# Patient Record
Sex: FEMALE | Race: WHITE | NOT HISPANIC OR LATINO | Employment: OTHER | URBAN - METROPOLITAN AREA
[De-identification: names, ages, dates, MRNs, and addresses within clinical notes are randomized per-mention and may not be internally consistent; named-entity substitution may affect disease eponyms.]

---

## 2017-01-23 ENCOUNTER — OFFICE VISIT (OUTPATIENT)
Dept: CARDIOLOGY | Facility: CLINIC | Age: 82
End: 2017-01-23

## 2017-01-23 VITALS
HEIGHT: 59 IN | SYSTOLIC BLOOD PRESSURE: 155 MMHG | DIASTOLIC BLOOD PRESSURE: 76 MMHG | BODY MASS INDEX: 23.39 KG/M2 | HEART RATE: 60 BPM | WEIGHT: 116 LBS

## 2017-01-23 DIAGNOSIS — I50.33 ACUTE ON CHRONIC DIASTOLIC HEART FAILURE (HCC): ICD-10-CM

## 2017-01-23 DIAGNOSIS — E78.00 HYPERCHOLESTEROLEMIA: ICD-10-CM

## 2017-01-23 DIAGNOSIS — I10 ESSENTIAL HYPERTENSION: ICD-10-CM

## 2017-01-23 DIAGNOSIS — I48.0 PAROXYSMAL ATRIAL FIBRILLATION (HCC): Primary | Chronic | ICD-10-CM

## 2017-01-23 PROCEDURE — 99213 OFFICE O/P EST LOW 20 MIN: CPT | Performed by: INTERNAL MEDICINE

## 2017-01-23 PROCEDURE — 93288 INTERROG EVL PM/LDLS PM IP: CPT | Performed by: INTERNAL MEDICINE

## 2017-01-23 NOTE — LETTER
January 23, 2017     David Thomas MD  [address] [facility]  [place]  [facility] [address]    Patient: Clarissa Yang   YOB: 1927   Date of Visit: 1/23/2017       Dear Dr. William MD:    Thank you for referring Clarissa Yang to me for evaluation. Below are the relevant portions of my assessment and plan of care.    If you have questions, please do not hesitate to call me. I look forward to following Clarissa along with you.         Sincerely,        Amrit Man MD        CC: No Recipients  Amrit Man MD  1/23/2017 12:21 PM  Signed      Subjective:     Encounter Date:01/23/2017      Patient ID: Clarissa Yang is a 89 y.o. female.    Chief Complaint: Atrial Fibrillation    Problem List:  1. Atrial fibrillation.  1. Echocardiogram 6/9/2016: EF 60-65%. Mild concentric LVH. Moderate MR.  2. COPD (chronic obstructive pulmonary disease).  3. Acute on chronic diastolic heart failure.  4. Mitral regurgitation.  5. SSS (sick sinus syndrome).  6. Diplopia.  7. Diverticulitis of colon.  8. Former smoker.  9. Mild anemia with prior history of GI bleed.  10. Gout.  11. Hyperlipidemia.  12. Hypertension.  13. Hypothyroidism.  14. Pulmonary edema.  15. Skin cancer.  16. Scoliosis.  17. Surgical history.  1. Rotator cuff repair.  2. Cardiac pacemaker placement, SJM.    History of Present Illness  Patient returns today for follow up with a history of atrial fibrillation and for a pacemaker check. Since last visit, patient has been feeling well. Patient notes that she can feel when her heart is racing. The most recent episode occurred while she was laying down in bed. Denies any exertional chest pain, shortness of breath, orthopnea, PND, or palpitations. She is trying to remain active but has a recent hip problem, on top of her back problem. Her leg buckled on her while she was walking down the steps about a month ago. She uses a walker at home.  Recent fall, no dizziness, simply having her  "right hip \"give out\",    No Known Allergies      Current Outpatient Prescriptions:   •  albuterol (PROVENTIL HFA;VENTOLIN HFA) 108 (90 BASE) MCG/ACT inhaler, Inhale 2 puffs As Needed., Disp: , Rfl:   •  ASPIRIN PO, Take 81 mg by mouth daily., Disp: , Rfl:   •  Calcium Carbonate-Vitamin D (CALCIUM + D PO), Take 1 tablet by mouth 2 (two) times a day., Disp: , Rfl:   •  flecainide (TAMBOCOR) 100 MG tablet, Take 100 mg by mouth 2 (two) times a day., Disp: , Rfl:   •  FUROSEMIDE PO, Take  by mouth Daily., Disp: , Rfl:   •  levothyroxine (SYNTHROID, LEVOTHROID) 75 MCG tablet, Take 75 mcg by mouth daily., Disp: , Rfl:   •  METOPROLOL TARTRATE PO, Take 50 mg by mouth 2 (two) times a day., Disp: , Rfl:   •  ranitidine (ZANTAC) 150 MG tablet, Take  by mouth daily., Disp: , Rfl:   •  SIMVASTATIN PO, Take 20 mg by mouth every night., Disp: , Rfl:     The following portions of the patient's history were reviewed and updated as appropriate: allergies, current medications, past family history, past medical history, past social history, past surgical history and problem list.    Review of Systems   Constitution: Negative.   Cardiovascular: Negative.    Respiratory: Negative.    Hematologic/Lymphatic: Negative for bleeding problem. Does not bruise/bleed easily.   Skin: Negative for rash.   Musculoskeletal: Negative for muscle weakness and myalgias.   Gastrointestinal: Negative for heartburn, nausea and vomiting.   Neurological: Negative.           Objective:     Vitals:    01/23/17 1143   BP: 155/76   BP Location: Left arm   Patient Position: Sitting   Pulse: 60   Weight: 116 lb (52.6 kg)   Height: 59\" (149.9 cm)         Physical Exam   Constitutional: She is oriented to person, place, and time. She appears well-developed and well-nourished.   HENT:   Mouth/Throat: Oropharynx is clear and moist.   Neck: No JVD present. Carotid bruit is not present. No thyromegaly present.   Cardiovascular: Regular rhythm, S1 normal, S2 normal, " normal heart sounds and intact distal pulses.  Exam reveals no gallop, no S3 and no S4.    No murmur heard.  Pulses:       Carotid pulses are 2+ on the right side, and 2+ on the left side.       Radial pulses are 2+ on the right side, and 2+ on the left side.   Pulmonary/Chest: Breath sounds normal.   Abdominal: Soft. Bowel sounds are normal. She exhibits no mass. There is no tenderness.   Musculoskeletal: She exhibits no edema.   Neurological: She is alert and oriented to person, place, and time.   Skin: Skin is warm and dry. No rash noted.       Lab Review:    Lab Results   Component Value Date    GLUCOSE 85 06/18/2016    BUN 24 (H) 06/18/2016    CREATININE 1.20 06/18/2016    EGFRIFNONA 42 (L) 06/18/2016    BCR 20.0 06/18/2016    CO2 30.0 06/18/2016    CALCIUM 10.1 06/18/2016    ALBUMIN 3.80 06/18/2016    LABIL2 1.1 06/18/2016    AST 31 06/18/2016    ALT 38 06/18/2016     Lab Results   Component Value Date    WBC 13.52 (H) 06/18/2016    HGB 11.9 06/18/2016    HCT 37.6 06/18/2016    MCV 92.4 06/18/2016     06/18/2016     Lab Results   Component Value Date    TSH 2.320 06/08/2016     Lab Results   Component Value Date    HGBA1C 5.4 04/12/2016       Procedures    DEVICE INTERROGATION:  1/23/2017, St Lb PPM: RA pacing 98%, RV pacing 39%. P wave with a threshold of 0.75 V at 0.5 msec and an impedance of 380 ohms. R wave is 5.5 mV with a threshold of 1.5 V at 0.5 msec and an impedance of 650 ohms. Battery voltage is 2.89 V. < 1% mode switch, 1hr 37 min.          Assessment:   Clarissa was seen today for atrial fibrillation.    Diagnoses and all orders for this visit:    Paroxysmal atrial fibrillation    Essential hypertension    Hypercholesterolemia    Acute on chronic diastolic heart failure with LVEF 60-65%        Impression  1. Paroxysmal atrial fibrillation.  Rare brief episode on device check.  Asymptomatic.  On 1C antiarrhythmic and beta blocker.  2. Hypertension controlled  3. Diastolic heart failure,  currently euvolemic  4. Sick sinus syndrome.  Normal pacemaker function.    Plan:  1. Discussed continued observation for A. fib.  Her A. fib burden is not enough to warrant anticoagulation, particularly given her recent fall.  She understands if she has more events to notify us, as we may eventually need to either switch antiarrhythmics, or start anticoagulation.  2. Continue current medications.  3. Revisit in 6 MO, or sooner as needed.    Scribed for Amrit Man MD by Maxime Montalvo. 1/23/2017  12:06 PM    I, Amrit Man MD, personally performed the services described in this documentation as scribed by the above individual in my presence, and it is both accurate and complete

## 2017-01-23 NOTE — PROGRESS NOTES
"    Subjective:     Encounter Date:01/23/2017      Patient ID: Clarissa Yang is a 89 y.o. female.    Chief Complaint: Atrial Fibrillation    Problem List:  1. Atrial fibrillation.  1. Echocardiogram 6/9/2016: EF 60-65%. Mild concentric LVH. Moderate MR.  2. COPD (chronic obstructive pulmonary disease).  3. Acute on chronic diastolic heart failure.  4. Mitral regurgitation.  5. SSS (sick sinus syndrome).  6. Diplopia.  7. Diverticulitis of colon.  8. Former smoker.  9. Mild anemia with prior history of GI bleed.  10. Gout.  11. Hyperlipidemia.  12. Hypertension.  13. Hypothyroidism.  14. Pulmonary edema.  15. Skin cancer.  16. Scoliosis.  17. Surgical history.  1. Rotator cuff repair.  2. Cardiac pacemaker placement, SJM.    History of Present Illness  Patient returns today for follow up with a history of atrial fibrillation and for a pacemaker check. Since last visit, patient has been feeling well. Patient notes that she can feel when her heart is racing. The most recent episode occurred while she was laying down in bed. Denies any exertional chest pain, shortness of breath, orthopnea, PND, or palpitations. She is trying to remain active but has a recent hip problem, on top of her back problem. Her leg buckled on her while she was walking down the steps about a month ago. She uses a walker at home.  Recent fall, no dizziness, simply having her right hip \"give out\",    No Known Allergies      Current Outpatient Prescriptions:   •  albuterol (PROVENTIL HFA;VENTOLIN HFA) 108 (90 BASE) MCG/ACT inhaler, Inhale 2 puffs As Needed., Disp: , Rfl:   •  ASPIRIN PO, Take 81 mg by mouth daily., Disp: , Rfl:   •  Calcium Carbonate-Vitamin D (CALCIUM + D PO), Take 1 tablet by mouth 2 (two) times a day., Disp: , Rfl:   •  flecainide (TAMBOCOR) 100 MG tablet, Take 100 mg by mouth 2 (two) times a day., Disp: , Rfl:   •  FUROSEMIDE PO, Take  by mouth Daily., Disp: , Rfl:   •  levothyroxine (SYNTHROID, LEVOTHROID) 75 MCG tablet, " "Take 75 mcg by mouth daily., Disp: , Rfl:   •  METOPROLOL TARTRATE PO, Take 50 mg by mouth 2 (two) times a day., Disp: , Rfl:   •  ranitidine (ZANTAC) 150 MG tablet, Take  by mouth daily., Disp: , Rfl:   •  SIMVASTATIN PO, Take 20 mg by mouth every night., Disp: , Rfl:     The following portions of the patient's history were reviewed and updated as appropriate: allergies, current medications, past family history, past medical history, past social history, past surgical history and problem list.    Review of Systems   Constitution: Negative.   Cardiovascular: Negative.    Respiratory: Negative.    Hematologic/Lymphatic: Negative for bleeding problem. Does not bruise/bleed easily.   Skin: Negative for rash.   Musculoskeletal: Negative for muscle weakness and myalgias.   Gastrointestinal: Negative for heartburn, nausea and vomiting.   Neurological: Negative.           Objective:     Vitals:    01/23/17 1143   BP: 155/76   BP Location: Left arm   Patient Position: Sitting   Pulse: 60   Weight: 116 lb (52.6 kg)   Height: 59\" (149.9 cm)         Physical Exam   Constitutional: She is oriented to person, place, and time. She appears well-developed and well-nourished.   HENT:   Mouth/Throat: Oropharynx is clear and moist.   Neck: No JVD present. Carotid bruit is not present. No thyromegaly present.   Cardiovascular: Regular rhythm, S1 normal, S2 normal, normal heart sounds and intact distal pulses.  Exam reveals no gallop, no S3 and no S4.    No murmur heard.  Pulses:       Carotid pulses are 2+ on the right side, and 2+ on the left side.       Radial pulses are 2+ on the right side, and 2+ on the left side.   Pulmonary/Chest: Breath sounds normal.   Abdominal: Soft. Bowel sounds are normal. She exhibits no mass. There is no tenderness.   Musculoskeletal: She exhibits no edema.   Neurological: She is alert and oriented to person, place, and time.   Skin: Skin is warm and dry. No rash noted.       Lab Review:    Lab Results "   Component Value Date    GLUCOSE 85 06/18/2016    BUN 24 (H) 06/18/2016    CREATININE 1.20 06/18/2016    EGFRIFNONA 42 (L) 06/18/2016    BCR 20.0 06/18/2016    CO2 30.0 06/18/2016    CALCIUM 10.1 06/18/2016    ALBUMIN 3.80 06/18/2016    LABIL2 1.1 06/18/2016    AST 31 06/18/2016    ALT 38 06/18/2016     Lab Results   Component Value Date    WBC 13.52 (H) 06/18/2016    HGB 11.9 06/18/2016    HCT 37.6 06/18/2016    MCV 92.4 06/18/2016     06/18/2016     Lab Results   Component Value Date    TSH 2.320 06/08/2016     Lab Results   Component Value Date    HGBA1C 5.4 04/12/2016       Procedures    DEVICE INTERROGATION:  1/23/2017, St Lb PPM: RA pacing 98%, RV pacing 39%. P wave with a threshold of 0.75 V at 0.5 msec and an impedance of 380 ohms. R wave is 5.5 mV with a threshold of 1.5 V at 0.5 msec and an impedance of 650 ohms. Battery voltage is 2.89 V. < 1% mode switch, 1hr 37 min.          Assessment:   Clarissa was seen today for atrial fibrillation.    Diagnoses and all orders for this visit:    Paroxysmal atrial fibrillation    Essential hypertension    Hypercholesterolemia    Acute on chronic diastolic heart failure with LVEF 60-65%        Impression  1. Paroxysmal atrial fibrillation.  Rare brief episode on device check.  Asymptomatic.  On 1C antiarrhythmic and beta blocker.  2. Hypertension controlled  3. Diastolic heart failure, currently euvolemic  4. Sick sinus syndrome.  Normal pacemaker function.    Plan:  1. Discussed continued observation for A. fib.  Her A. fib burden is not enough to warrant anticoagulation, particularly given her recent fall.  She understands if she has more events to notify us, as we may eventually need to either switch antiarrhythmics, or start anticoagulation.  2. Continue current medications.  3. Revisit in 6 MO, or sooner as needed.    Scribed for Amrit Man MD by Maxime Montalvo. 1/23/2017  12:06 PM    Amrit CORTEZ MD, personally performed the services  described in this documentation as scribed by the above individual in my presence, and it is both accurate and complete

## 2017-01-23 NOTE — MR AVS SNAPSHOT
Clarissa A Celia   1/23/2017 11:30 AM   Office Visit    Dept Phone:  [phone]   Encounter #:  [id number]    Provider:  Amrit Man MD   Department:  [facility] [place] [facility]                Your Full Care Plan              Today's Medication Changes          These changes are accurate as of: 1/23/17 12:17 PM.  If you have any questions, ask your nurse or doctor.               Stop taking medication(s)listed here:     ADVAIR DISKUS IN   Stopped by:  Amrit Mna MD           METAMUCIL PO   Stopped by:  Amrit Man MD           MIRALAX powder   Generic drug:  polyethylene glycol   Stopped by:  Amrit Man MD                      Your Updated Medication List          This list is accurate as of: 1/23/17 12:17 PM.  Always use your most recent med list.                albuterol 108 (90 BASE) MCG/ACT inhaler   Commonly known as:  PROVENTIL HFA;VENTOLIN HFA       ASPIRIN PO       CALCIUM + D PO       flecainide 100 MG tablet   Commonly known as:  TAMBOCOR       FUROSEMIDE PO       levothyroxine 75 MCG tablet   Commonly known as:  SYNTHROID, LEVOTHROID       METOPROLOL TARTRATE PO       raNITIdine 150 MG tablet   Commonly known as:  ZANTAC       SIMVASTATIN PO               You Were Diagnosed With        Codes Comments    Paroxysmal atrial fibrillation    -  Primary ICD-10-CM: I48.0  ICD-9-CM: 427.31     Essential hypertension     ICD-10-CM: I10  ICD-9-CM: 401.9     Hypercholesterolemia     ICD-10-CM: E78.00  ICD-9-CM: 272.0     Acute on chronic diastolic heart failure     ICD-10-CM: I50.33  ICD-9-CM: 428.33       Instructions     None    Patient Instructions History      Upcoming Appointments     Visit Type Date Time Department    FOLLOW UP 1/23/2017 11:30 AM [facility]    FOLLOW UP 7/24/2017  9:30 AM [facility]      MyChart Signup     Our records indicate that you have declined [facility] signup. If you would like to sign up  "for [facility], please email [email or web address] or call [phone] to obtain an activation code.             Other Info from Your Visit           Your Appointments     Jul 24, 2017  9:30 AM EDT   Follow Up with Amrit Man MD   [facility] (--)    [address] [place] [address]  [facility] [id number] [phone]           Arrive 15 minutes prior to appointment.              Allergies     No Known Allergies      Reason for Visit     Atrial Fibrillation           Vital Signs     Blood Pressure Pulse Height Weight Body Mass Index Smoking Status    155/76 (BP Location: Left arm, Patient Position: Sitting) 60 59\" (149.9 cm) 116 lb (52.6 kg) 23.43 kg/m2 Former Smoker      Problems and Diagnoses Noted     Heart failure    High blood pressure    Atrial fibrillation (irregular heartbeat)    High cholesterol            "

## 2017-10-19 ENCOUNTER — OFFICE VISIT (OUTPATIENT)
Dept: CARDIOLOGY | Facility: CLINIC | Age: 82
End: 2017-10-19

## 2017-10-19 VITALS
SYSTOLIC BLOOD PRESSURE: 159 MMHG | BODY MASS INDEX: 23.91 KG/M2 | WEIGHT: 118.6 LBS | HEART RATE: 71 BPM | HEIGHT: 59 IN | DIASTOLIC BLOOD PRESSURE: 80 MMHG

## 2017-10-19 DIAGNOSIS — I48.0 PAROXYSMAL ATRIAL FIBRILLATION (HCC): Primary | Chronic | ICD-10-CM

## 2017-10-19 DIAGNOSIS — I49.5 SSS (SICK SINUS SYNDROME) (HCC): ICD-10-CM

## 2017-10-19 DIAGNOSIS — I10 ESSENTIAL HYPERTENSION: ICD-10-CM

## 2017-10-19 PROCEDURE — 99214 OFFICE O/P EST MOD 30 MIN: CPT | Performed by: NURSE PRACTITIONER

## 2017-10-19 PROCEDURE — 93280 PM DEVICE PROGR EVAL DUAL: CPT | Performed by: NURSE PRACTITIONER

## 2017-10-19 NOTE — PROGRESS NOTES
"  Subjective:     Encounter Date:10/19/2017      Patient ID: Clarissa Yang is a 89 y.o. female.    Chief Complaint: Atrial Fibrillation (follow up)    PROBLEM LIST:  1. Atrial fibrillation  a. Echocardiogram (06/09/2016): EF 60-65%. Mild concentric LVH. Moderate MR.  2. COPD (chronic obstructive pulmonary disease)   3. Acute on chronic diastolic heart failure.  4. Mitral regurgitation.  5. SSS (sick sinus syndrome).  a. SJ PPM (Dr. Castillo)  6. Hypertension.   7. Hyperlipidemia  8. Diplopia.  9. Diverticulosis of colon.  10. History of tobacco use, cessation.  11. Mild anemia with prior history of GI bleed.  12. Gout.  13. Hypothyroidism.  14. Pulmonary edema  15. Skin cancer.  16. Scoliosis.  17. Surgical history:  a. Rotator cuff repair.   b. Cardiac pacemaker placement, SJ.     History of Present Illness  Clarissa Yang presents today for a follow-up with a history of atrial fibrillation, diastolic heart failure, sick sinus syndrome, and cardiac risk factors. Since last visit, patient has been Overall doing well.  Denies any chest pain, pressure, tightness.  Denies any increasing shortness of breath.  Denies any syncope, near-syncope.  States that she has had some swelling in her left foot.  This initially would get better with elevation of her extremities.  Recently this has not been improving as much.  She does note that she drinks a \"lot\" of fluid.  Does not always keep her extremities elevated. Patient notes that she has had issues with her electrophysiologist office.  She wishes to be followed by one group and would like to transfer all of her care here.    No Known Allergies      Current Outpatient Prescriptions:   •  albuterol (PROVENTIL HFA;VENTOLIN HFA) 108 (90 BASE) MCG/ACT inhaler, Inhale 2 puffs As Needed., Disp: , Rfl:   •  ASPIRIN PO, Take 81 mg by mouth daily., Disp: , Rfl:   •  Calcium Carbonate-Vitamin D (CALCIUM + D PO), Take 1 tablet by mouth 2 (two) times a day., Disp: , Rfl:   •  " "flecainide (TAMBOCOR) 100 MG tablet, Take 100 mg by mouth 2 (two) times a day., Disp: , Rfl:   •  FUROSEMIDE PO, Take  by mouth Daily., Disp: , Rfl:   •  levothyroxine (SYNTHROID, LEVOTHROID) 75 MCG tablet, Take 75 mcg by mouth daily., Disp: , Rfl:   •  METOPROLOL TARTRATE PO, Take 50 mg by mouth 2 (two) times a day., Disp: , Rfl:   •  ranitidine (ZANTAC) 150 MG tablet, Take  by mouth daily., Disp: , Rfl:   •  SIMVASTATIN PO, Take 20 mg by mouth every night., Disp: , Rfl:     The following portions of the patient's history were reviewed and updated as appropriate: allergies, current medications, past family history, past medical history, past social history, past surgical history and problem list.    Review of Systems   Constitution: Positive for malaise/fatigue.   Cardiovascular: Positive for leg swelling. Negative for chest pain, dyspnea on exertion, irregular heartbeat and syncope.   Respiratory: Negative.    Hematologic/Lymphatic: Negative for bleeding problem. Does not bruise/bleed easily.   Skin: Negative for rash.   Musculoskeletal: Negative for muscle weakness and myalgias.        Walks with the assistance of cane   Gastrointestinal: Negative for heartburn, nausea and vomiting.   Neurological: Negative.           Objective:     Vitals:    10/19/17 1445   BP: 159/80   BP Location: Left arm   Patient Position: Sitting   Pulse: 71   Weight: 118 lb 9.6 oz (53.8 kg)   Height: 59\" (149.9 cm)         Physical Exam   Constitutional: She is oriented to person, place, and time. She appears well-developed and well-nourished.   Neck: No JVD present. No tracheal deviation present.   No bruit auscultated bilaterally   Cardiovascular: Normal rate, regular rhythm and normal heart sounds.  Exam reveals no friction rub.    No murmur heard.  Pulmonary/Chest: Effort normal and breath sounds normal.   Abdominal: Soft. Bowel sounds are normal. There is no tenderness.   Musculoskeletal: She exhibits edema (trace left pedal edema). " She exhibits no deformity.   Neurological: She is alert and oriented to person, place, and time.   Skin: Skin is warm and dry.       Lab Review:      ECG 12 Lead  Date/Time: 10/19/2017 5:05 PM  Performed by: MICHELLE MONTEMAYOR  Authorized by: MICHELLE MONTEMAYOR   Comparison: compared with previous ECG from 7/18/2016  Similar to previous ECG  Comments: Nonspecific ST changes.  QRS is acceptable.          Device check:  Normal function.  4.4 years battery life.  94% atrially paced less than 1% ventricularly paced.  One episode of mode switching which was felt to be false-positive.        Assessment:   Clarissa was seen today for atrial fibrillation.    Diagnoses and all orders for this visit:    Paroxysmal atrial fibrillation, currently stable.    SSS (sick sinus syndrome), status post pacemaker.  Normal device check today.    Essential hypertension        Plan:  1. We'll have the patient's remote monitoring transferred to our office.  2. Continue current medications as listed above.  3. Revisit in 6 months with a device check, or sooner as needed.    CODIE Hampton     Please note that portions of this note may have been completed with a voice recognition program. Efforts were made to edit the dictations, but occasionally words are mistranscribed.

## 2017-11-29 ENCOUNTER — TELEPHONE (OUTPATIENT)
Dept: CARDIOLOGY | Facility: CLINIC | Age: 82
End: 2017-11-29

## 2017-11-29 NOTE — TELEPHONE ENCOUNTER
----- Message from CODIE Hampton sent at 11/29/2017  9:03 AM EST -----  Would not hold ASA  ----- Message -----     From: Hillary Hendrix RN     Sent: 11/28/2017   4:33 PM       To: CODIE Hampton    This patient was given cardiac clearance for surgery last week, but called today and states they want her to hold her aspirin one week before hip surgery.  Is this appropriate?      Called patient and advised her that provider would not advise her to hold her aspirin.  She verbalized understanding.

## 2018-01-01 ENCOUNTER — TELEPHONE (OUTPATIENT)
Dept: CARDIOLOGY | Facility: CLINIC | Age: 83
End: 2018-01-01

## 2018-01-01 ENCOUNTER — CLINICAL SUPPORT NO REQUIREMENTS (OUTPATIENT)
Dept: CARDIOLOGY | Facility: CLINIC | Age: 83
End: 2018-01-01

## 2018-01-01 ENCOUNTER — OFFICE VISIT (OUTPATIENT)
Dept: CARDIOLOGY | Facility: CLINIC | Age: 83
End: 2018-01-01

## 2018-01-01 VITALS
HEIGHT: 59 IN | BODY MASS INDEX: 23.02 KG/M2 | WEIGHT: 114.2 LBS | DIASTOLIC BLOOD PRESSURE: 72 MMHG | SYSTOLIC BLOOD PRESSURE: 132 MMHG | HEART RATE: 74 BPM

## 2018-01-01 VITALS
WEIGHT: 122 LBS | HEART RATE: 74 BPM | BODY MASS INDEX: 24.6 KG/M2 | HEIGHT: 59 IN | SYSTOLIC BLOOD PRESSURE: 136 MMHG | DIASTOLIC BLOOD PRESSURE: 60 MMHG

## 2018-01-01 VITALS
SYSTOLIC BLOOD PRESSURE: 150 MMHG | HEART RATE: 63 BPM | WEIGHT: 123.4 LBS | HEIGHT: 59 IN | BODY MASS INDEX: 24.88 KG/M2 | OXYGEN SATURATION: 97 % | DIASTOLIC BLOOD PRESSURE: 94 MMHG

## 2018-01-01 DIAGNOSIS — E78.2 MIXED HYPERLIPIDEMIA: Chronic | ICD-10-CM

## 2018-01-01 DIAGNOSIS — I48.0 PAROXYSMAL ATRIAL FIBRILLATION (HCC): Primary | ICD-10-CM

## 2018-01-01 DIAGNOSIS — I49.5 SSS (SICK SINUS SYNDROME) (HCC): ICD-10-CM

## 2018-01-01 DIAGNOSIS — I50.33 ACUTE ON CHRONIC DIASTOLIC HEART FAILURE (HCC): ICD-10-CM

## 2018-01-01 DIAGNOSIS — I48.0 PAROXYSMAL ATRIAL FIBRILLATION (HCC): Primary | Chronic | ICD-10-CM

## 2018-01-01 DIAGNOSIS — I10 ESSENTIAL HYPERTENSION: ICD-10-CM

## 2018-01-01 DIAGNOSIS — I48.0 PAROXYSMAL ATRIAL FIBRILLATION (HCC): Chronic | ICD-10-CM

## 2018-01-01 DIAGNOSIS — I10 ESSENTIAL HYPERTENSION: Primary | ICD-10-CM

## 2018-01-01 PROCEDURE — 93280 PM DEVICE PROGR EVAL DUAL: CPT | Performed by: INTERNAL MEDICINE

## 2018-01-01 PROCEDURE — 93294 REM INTERROG EVL PM/LDLS PM: CPT | Performed by: INTERNAL MEDICINE

## 2018-01-01 PROCEDURE — 99214 OFFICE O/P EST MOD 30 MIN: CPT | Performed by: INTERNAL MEDICINE

## 2018-01-01 PROCEDURE — 93296 REM INTERROG EVL PM/IDS: CPT | Performed by: INTERNAL MEDICINE

## 2018-01-01 RX ORDER — POTASSIUM CHLORIDE 750 MG/1
CAPSULE, EXTENDED RELEASE ORAL
Refills: 0 | COMMUNITY
Start: 2018-01-01

## 2018-01-01 RX ORDER — AMIODARONE HYDROCHLORIDE 200 MG/1
200 TABLET ORAL DAILY
Qty: 30 TABLET | Refills: 6 | Status: SHIPPED | OUTPATIENT
Start: 2018-01-01

## 2018-01-01 RX ORDER — SIMVASTATIN 20 MG
20 TABLET ORAL DAILY
COMMUNITY
Start: 2017-01-01

## 2018-01-01 RX ORDER — ALENDRONATE SODIUM 70 MG/1
TABLET ORAL
Refills: 0 | COMMUNITY
Start: 2018-01-01

## 2018-01-01 RX ORDER — IPRATROPIUM BROMIDE AND ALBUTEROL SULFATE 2.5; .5 MG/3ML; MG/3ML
SOLUTION RESPIRATORY (INHALATION)
Refills: 6 | COMMUNITY
Start: 2018-01-01

## 2018-01-01 RX ORDER — LEVOTHYROXINE SODIUM 88 UG/1
1 TABLET ORAL DAILY
Refills: 1 | COMMUNITY
Start: 2018-01-01

## 2018-01-01 RX ORDER — ALBUTEROL SULFATE 90 UG/1
2 AEROSOL, METERED RESPIRATORY (INHALATION) 2 TIMES DAILY PRN
COMMUNITY
End: 2018-01-01 | Stop reason: SDDI

## 2018-01-01 RX ORDER — APIXABAN 2.5 MG/1
2.5 TABLET, FILM COATED ORAL 2 TIMES DAILY
COMMUNITY
Start: 2018-01-01 | End: 2018-01-01 | Stop reason: SDDI

## 2018-01-01 RX ORDER — FLUTICASONE PROPIONATE 50 MCG
1 SPRAY, SUSPENSION (ML) NASAL AS NEEDED
Refills: 0 | COMMUNITY
Start: 2017-12-01

## 2018-01-01 RX ORDER — METOPROLOL TARTRATE 50 MG/1
TABLET, FILM COATED ORAL
Refills: 1 | COMMUNITY
Start: 2017-12-18

## 2018-01-01 RX ORDER — TRAMADOL HYDROCHLORIDE 50 MG/1
TABLET ORAL AS NEEDED
COMMUNITY
Start: 2017-01-01 | End: 2018-01-01

## 2018-01-01 RX ORDER — AMIODARONE HYDROCHLORIDE 200 MG/1
200 TABLET ORAL 2 TIMES DAILY
Qty: 60 TABLET | Refills: 5 | Status: SHIPPED | OUTPATIENT
Start: 2018-01-01 | End: 2018-01-01 | Stop reason: SDUPTHER

## 2018-01-01 RX ORDER — FUROSEMIDE 40 MG/1
40 TABLET ORAL DAILY
Refills: 0 | COMMUNITY
Start: 2018-01-01

## 2018-01-01 RX ORDER — RANITIDINE 300 MG/1
TABLET ORAL
Refills: 6 | COMMUNITY
Start: 2017-11-30 | End: 2018-01-01

## 2018-02-05 NOTE — PROGRESS NOTES
"    Subjective:     Encounter Date:02/05/2018      Patient ID: Clarissa Yang is a 89 y.o. female.    Chief Complaint: Paroxysmal atrial fibrillation    PROBLEM LIST:  1. Atrial fibrillation  a. Echocardiogram (06/09/2016): EF 60-65%. Mild concentric LVH. Moderate MRYolanda moran. Flecainide, with toxicity in the setting of renal failure 2017  2. COPD (chronic obstructive pulmonary disease)   3. Acute on chronic diastolic heart failure.  4. Mitral regurgitation.  5. SSS (sick sinus syndrome).  a. SJM PPM (Dr. Castillo)  6. Hypertension.   7. Hyperlipidemia  8. Diplopia.  9. Diverticulosis of colon.  10. History of tobacco use, cessation.  11. Mild anemia with prior history of GI bleed.  12. Gout.  13. Hypothyroidism.  14. Pulmonary edema  15. Skin cancer.  16. Scoliosis.  17. Surgical history:  a. Rotator cuff repair.   b. Cardiac pacemaker placement, SJM.     History of Present Illness  Clarissa Yang returns today for a hospital follow up with a history of atrial fibrillation among other cardiac risk factors. Since last visit she has been doing well from a cardiovascular standpoint. On December 6th, Mrs. Yang reported to the hospital for hip surgery. She went home on all of the same medications. No bleeding or transfusion during operation. Upon coming home, her appetite was \"off\" and she wasn't feeling well. She had more shortness of breath than usual describing it as \"similar to having fluid in her lungs\". She began having back spasms for which she was prescribed tizanidine. Discontinued tizanidine after severe allergic reaction. Furthermore, discontinued flecainide, potassium and lasix. Otherwise, notes that her LLE is consistently swollen. She doesn't \"feel much of anything\" in her heart, but describes it as agitated on occasion. Adds that her heart occasionally races at night. Denies any exertional chest pain, shortness of breath, orthopnea, PND, or palpitations. Ms. Yang has been trying to watch her " "salt intake.     Allergies   Allergen Reactions   • Morphine And Related Itching   • Tizanidine Hallucinations     hypotension       Current Outpatient Prescriptions:   •  ADVAIR DISKUS 250-50 MCG/DOSE DISKUS, As Needed., Disp: , Rfl:   •  albuterol (PROVENTIL HFA;VENTOLIN HFA) 108 (90 BASE) MCG/ACT inhaler, Inhale 2 puffs As Needed., Disp: , Rfl:   •  ELIQUIS 2.5 MG tablet tablet, 2 (Two) Times a Day., Disp: , Rfl:   •  fluticasone (FLONASE) 50 MCG/ACT nasal spray, As Needed., Disp: , Rfl: 0  •  levothyroxine (SYNTHROID, LEVOTHROID) 75 MCG tablet, Take 75 mcg by mouth daily., Disp: , Rfl:   •  metoprolol tartrate (LOPRESSOR) 50 MG tablet, TK 1 T PO BID, Disp: , Rfl: 1  •  raNITIdine (ZANTAC) 300 MG tablet, TK 1 T PO D, Disp: , Rfl: 6  •  simvastatin (ZOCOR) 20 MG tablet, Daily., Disp: , Rfl:   •  traMADol (ULTRAM) 50 MG tablet, As Needed., Disp: , Rfl:     The following portions of the patient's history were reviewed and updated as appropriate: allergies, current medications, past family history, past medical history, past social history, past surgical history and problem list.    Review of Systems   Constitution: Negative.   Cardiovascular: Positive for leg swelling.   Respiratory: Negative.    Hematologic/Lymphatic: Negative for bleeding problem. Does not bruise/bleed easily.   Skin: Negative for rash.   Musculoskeletal: Positive for back pain and joint swelling. Negative for muscle weakness and myalgias.   Gastrointestinal: Negative for heartburn, nausea and vomiting.   Neurological: Negative.         Objective:     Vitals:    02/05/18 1332   BP: 136/60   BP Location: Left arm   Patient Position: Sitting   Pulse: 74   Weight: 55.3 kg (122 lb)   Height: 149.9 cm (59\")       Physical Exam   Constitutional: She is oriented to person, place, and time. She appears well-developed and well-nourished.   HENT:   Mouth/Throat: Oropharynx is clear and moist.   Neck: No JVD present. Carotid bruit is not present. No " thyromegaly present.   Cardiovascular: Regular rhythm, S1 normal, S2 normal, normal heart sounds and intact distal pulses.  Exam reveals no gallop, no S3 and no S4.    No murmur heard.  Pulses:       Carotid pulses are 2+ on the right side, and 2+ on the left side.       Radial pulses are 2+ on the right side, and 2+ on the left side.   Pulmonary/Chest: Breath sounds normal.   Abdominal: Soft. Bowel sounds are normal. She exhibits no mass. There is no tenderness.   Musculoskeletal: She exhibits edema (2+ left lower extremity only.  Venous varicosities noted.).   Neurological: She is alert and oriented to person, place, and time.   Skin: Skin is warm and dry. No rash noted.     Lab Review:    Procedures    Device Interrogation, 2/5/2018  P = 2.2 mV, RA = 18%, RV = 94%  R = 7.0 mV  Battery voltage = 2.86 v, 3.8-4.1 years  2.2 episodes of mode switching with the longest being 1.9 hours since her flecainide was discontinued.  Restart RV sensitivity to 2 mV and set her AV delay to 250.      Assessment:   Clarissa was seen today for paroxysmal atrial fibrillation.    Diagnoses and all orders for this visit:    Paroxysmal atrial fibrillation    Mixed hyperlipidemia    Essential hypertension      Impression:  1. Atrial fibrillation, Paroxysmal minimally symptomatic.  That is post recent flecainide toxicity in the setting of renal failure  2. Hyperlipidemia is controlled with statin therapy.  3. Hypertension is well-controlled.   4. Known diastolic heart failure with recent exacerbation in setting of renal failure.  She is now off her diuretics, and is minimal evidence of volume overload.  5. Unilateral edema, seems due to localized venous disease.    Plan:  1. Take Lasix as needed when weight goes up. Take potassium with Lasix.  But only on a when necessary basis.  2. We'll not resume flecainide given her history of renal failure and previous toxicity.  Regarding her atrial fibrillation, would not resume antiarrhythmics at  this time.  I think that if she develops symptomatic recurrences, which is not currently having, we can always consider increasing beta-blockade and treated only for symptom relief, as she is adequately anticoagulated with low-dose Eliquis.  3. Continue other current medications.  4. Revisit in 12 MO, or sooner as needed.    Scribed for Amrit Man MD by Suhas Panda. 2/5/2018  2:24 PM    I, Amrit Man MD, personally performed the services described in this documentation as scribed by the above individual in my presence, and it is both accurate and complete      Please note that portions of this note may have been completed with a voice recognition program. Efforts were made to edit the dictations, but occasionally words are mistranscribed.

## 2018-02-28 NOTE — TELEPHONE ENCOUNTER
The patient called stating that she has been having episodes of rapid, irregular HR, particularly when she wakes up, that leave her feeling fatigued and dizzy.  She states she is unable to check her BP at home.  I advised, per TM's most recent note, that she may take 0.5 tablet additional metoprolol once daily when this occurs and to make sure she stays well hydrated.  If this does not resolve her issue she will call back.  Understanding and agreement verbalized at this time.

## 2018-03-30 NOTE — TELEPHONE ENCOUNTER
----- Message from Amrit Man MD sent at 3/30/2018  3:51 PM EDT -----  If patient is feeling palpitations on higher dose Lopressor please have her start amiodarone 200 milligrams twice a day and revisit with us in approximately after starting amiodarone    Called and spoke with patient who reports she feels pretty good today.  She did go purchase a blood pressure cuff today.  She is to check her blood pressure over the next week, keep a log and a record if she feels any type of palpitations and call us back next week.

## 2018-05-17 NOTE — PROGRESS NOTES
Subjective:     Encounter Date:05/17/2018      Patient ID: Clarissa Yang is a 89 y.o. female.    PCP: David Thomas MD    Chief Complaint: Paroxysmal atrial fibrillation       PROBLEM LIST:  1. Atrial fibrillation:  a. Echocardiogram (06/09/2016): EF 60-65%. Mild concentric LVH. Moderate MR.  b. Flecainide, with toxicity in the setting of renal failure (2017).  2. Acute on chronic diastolic heart failure.  3. Mitral regurgitation.  4. SSS (sick sinus syndrome):  a. St. Lb PPM, Dr. Castillo.  5. Hypertension.   6. Hyperlipidemia.  7. COPD (chronic obstructive pulmonary disease).  8. Diplopia.  9. Diverticulosis of colon.  10. History of tobacco use, with cessation.  11. Mild anemia with prior history of GI bleed.  12. Gout.  13. Hypothyroidism.  14. Pulmonary edema.  15. Skin cancer.  16. Scoliosis.  17. Surgical history:  a. Rotator cuff repair.   b. Cardiac pacemaker placement, SJM.     History of Present Illness  Clarissa Yang presents today for a 3 month follow-up with a device interrogation. The patient has a history of atrial fibrillation and cardiac risk factors. Since last visit, patient has been hospitalized for pneumonia/lung infection (unsure of actual diagnosis), during which she was in a-fib for 24 hours. During her time in the hospital, her Lasix was doubled. She mentions having an episode of lightheadedness  on Monday, which caused her to need to sit down.   2 recent hospitalizations at [facility] due to pneumonia versus heart failure, and subsequently cellulitis.  He was noted to be in atrial fibrillation during these admissions.. She brings attention to the bruising that covers much of her skin and wonders whether she should be concerned about this. Denies chest pain, shortness of breath, palpitations, and syncope.    Allergies   Allergen Reactions   • Morphine And Related Itching   • Tizanidine Hallucinations     hypotension         Current Outpatient Prescriptions:   •  " ADVAIR DISKUS 250-50 MCG/DOSE DISKUS, As Needed., Disp: , Rfl:   •  albuterol (PROVENTIL HFA;VENTOLIN HFA) 108 (90 BASE) MCG/ACT inhaler, Inhale 2 puffs As Needed., Disp: , Rfl:   •  amiodarone (PACERONE) 200 MG tablet, Take 1 tablet by mouth 2 (Two) Times a Day., Disp: 60 tablet, Rfl: 5  •  ELIQUIS 2.5 MG tablet tablet, 2 (Two) Times a Day., Disp: , Rfl:   •  fluticasone (FLONASE) 50 MCG/ACT nasal spray, As Needed., Disp: , Rfl: 0  •  furosemide (LASIX) 40 MG tablet, Take  by mouth Daily., Disp: , Rfl: 0  •  levothyroxine (SYNTHROID, LEVOTHROID) 75 MCG tablet, Take 75 mcg by mouth daily., Disp: , Rfl:   •  metoprolol tartrate (LOPRESSOR) 50 MG tablet, TK 1 T PO BID, Disp: , Rfl: 1  •  potassium chloride (MICRO-K) 10 MEQ CR capsule, TK 1 C PO QD, Disp: , Rfl: 0  •  simvastatin (ZOCOR) 20 MG tablet, Daily., Disp: , Rfl:     The following portions of the patient's history were reviewed and updated as appropriate: allergies, current medications, past family history, past medical history, past social history, past surgical history and problem list.    Review of Systems   Constitution: Positive for malaise/fatigue.   HENT: Positive for hearing loss.    Cardiovascular: Positive for irregular heartbeat and leg swelling (ankle, foot).   Respiratory: Positive for shortness of breath and wheezing.    Hematologic/Lymphatic: Negative for bleeding problem. Bruises/bleeds easily.   Skin: Positive for itching. Negative for rash.   Musculoskeletal: Positive for arthritis, back pain, joint swelling, neck pain and stiffness. Negative for muscle weakness and myalgias.   Gastrointestinal: Negative for heartburn, nausea and vomiting.   Neurological: Negative.           Objective:     Vitals:    05/17/18 1336   BP: 132/72   BP Location: Left arm   Patient Position: Sitting   Pulse: 74   Weight: 51.8 kg (114 lb 3.2 oz)   Height: 149.9 cm (59\")         Physical Exam   Constitutional: She is oriented to person, place, and time. She appears " well-developed and well-nourished.   HENT:   Mouth/Throat: Oropharynx is clear and moist.   Neck: No JVD present. Carotid bruit is not present. No thyromegaly present.   Cardiovascular: Regular rhythm, S1 normal, S2 normal, normal heart sounds and intact distal pulses.  Exam reveals no gallop, no S3 and no S4.    No murmur heard.  Pulses:       Carotid pulses are 2+ on the right side, and 2+ on the left side.       Radial pulses are 2+ on the right side, and 2+ on the left side.   Pulmonary/Chest: Breath sounds normal.   Abdominal: Soft. Bowel sounds are normal. She exhibits no mass. There is no tenderness.   Musculoskeletal: She exhibits edema (2+ left lower extremity only.  Venous varicosities noted.).   Neurological: She is alert and oriented to person, place, and time.   Skin: Skin is warm and dry. No rash noted.       Lab Review:    Procedures    DEVICE INTERROGATION: 05/17/2018, St. Lb PPM, 2210:   · RA pacing 43%  · RV pacing 7.5%  · Battery voltage: 2.86 v, 4.0-4.8 years.  · Charge time: N/A  · Events: 464 AMS (27%), max- 9 hrs.        Assessment:   Clarissa was seen today for paroxysmal atrial fibrillation.    Diagnoses and all orders for this visit:    Paroxysmal atrial fibrillation    Essential hypertension    Mixed hyperlipidemia        Impression  1. Paroxysmal atrial fibrillation:  Shown on device check. Minimally symptomatic, Occurring in the setting of recent pulmonary and infectious illnesses  2. Hypertension:  Controlled.   3. Dyslipidemia:  On statin therapy.  4. Sinus syndrome: Normal pacemaker function    Plan:  1. Decreased by mouth fluid intake sodium intake, as this appears to be somewhat higher than she needs.  2. Continue taking Amiodarone BID for one month, then decrease to once daily.  3. Obtain most recent labs, and have next labs sent.   4. Continue current medications, as listed above.  5. Revisit in 3-4 months with device check, or sooner as needed.    Scribed for Amrit Man MD  by Leonor Jonas. 5/17/2018  2:12 PM    I, Amrit Man MD, personally performed the services described in this documentation as scribed by the above individual in my presence, and it is both accurate and complete      Please note that portions of this note may have been completed with a voice recognition program. Efforts were made to edit the dictations, but occasionally words are mistranscribed.

## 2018-06-14 NOTE — TELEPHONE ENCOUNTER
Received VM from [facility] requesting cardiac clearance for parastomal hernia, including risks and recommendations for holding anticoagulants.  Called for records, [phone].  Fax for clearance [phone]

## 2018-06-15 NOTE — TELEPHONE ENCOUNTER
Patient is high non modifiable cardiac risk for intraabdominal surgery. Can hold Eliquis for 48 hours at their discretion, but patient is at elevated risk for neurologic event due to paroxysmal atrial fibrillation.

## 2018-09-17 NOTE — PROGRESS NOTES
Subjective:     Encounter Date:09/17/2018      Patient ID: Clarissa Yang is a 89 y.o. female.    Chief Complaint: FU on PAF/SSS, HTN, HLP    PROBLEM LIST:  1. Paroxysmal Atrial fibrillation:  a. Echocardiogram (06/09/2016): EF 60-65%. Mild concentric LVH. Moderate MR.  b. Flecainide, with toxicity in the setting of renal failure (2017).  2. Acute on chronic diastolic heart failure.  3. Mitral regurgitation.  4. SSS (sick sinus syndrome):  a. St. Lb PPM, Dr. Castillo.  5. Essential Hypertension.   6. Hyperlipidemia.  7. COPD (chronic obstructive pulmonary disease).  8. Diplopia.  9. Diverticulosis of colon.  10. History of tobacco use, with cessation.  11. Mild anemia with prior history of GI bleed.  12. Gout.  13. Hypothyroidism.  14. Pulmonary edema.  15. Skin cancer.  16. Scoliosis.  17. Surgical history:  a. Rotator cuff repair.   b. Cardiac pacemaker placement, SJM.     History of Present Illness  Clarissa Yang returns today for a 4 month follow up with a history of SSS/paroxysmal atrial fibrillation, controlled HTN and HLP. Since last visit she has been doing well from a cardiovascular standpoint. Denies any exertional chest pain, orthopnea, PND, or palpitations.  Which she does complain of his shortness of breath.  This is been ongoing for approximately 3-4 weeks.  This did prompt an early visit with us along with the fact that she wants to discuss the possibility of an upcoming herniorrhaphy.  She has been seen by Dr. Mccord and is needing clearance both by pulmonology and cardiology.  The shortness of breath is always with activity or exertion and it doesn't take a lot of activity to cause her symptoms.  When she sits down to rest she is back to baseline.  She denies any audible wheezing, denies cough.  She was seen by her pulmonologist who felt her symptoms were not related to her lungs.  She is concerned because there was confusion regarding her amiodarone while being resumed.  She is still  taking it twice a day although records indicate it should have been daily since May 2018.  She denies lower extremity edema, denies syncope or presyncopal episodes.    Allergies   Allergen Reactions   • Morphine And Related Itching   • Tizanidine Hallucinations     hypotension       Current Outpatient Prescriptions:   •  albuterol (PROVENTIL HFA;VENTOLIN HFA) 108 (90 BASE) MCG/ACT inhaler, Inhale 2 puffs As Needed., Disp: , Rfl:   •  alendronate (FOSAMAX) 70 MG tablet, TK 1 T PO Q WK, Disp: , Rfl: 0  •  amiodarone (PACERONE) 200 MG tablet, Take 1 tablet by mouth Daily. (Patient taking differently: Take 200 mg by mouth 2 (Two) Times a Day.), Disp: 30 tablet, Rfl: 6  •  aspirin 81 MG tablet, Take 81 mg by mouth Daily., Disp: , Rfl:   •  fluticasone (FLONASE) 50 MCG/ACT nasal spray, 1 spray into the nostril(s) as directed by provider As Needed., Disp: , Rfl: 0  •  furosemide (LASIX) 40 MG tablet, Take 40 mg by mouth Daily., Disp: , Rfl: 0  •  ipratropium-albuterol (DUO-NEB) 0.5-2.5 mg/3 ml nebulizer, U 1 VIAL VIA NEB QID PRN, Disp: , Rfl: 6  •  levothyroxine (SYNTHROID, LEVOTHROID) 88 MCG tablet, Take 1 tablet by mouth Daily., Disp: , Rfl: 1  •  metoprolol tartrate (LOPRESSOR) 50 MG tablet, TK 1 T PO BID, Disp: , Rfl: 1  •  O2 (OXYGEN), Inhale 2 L/min As Needed., Disp: , Rfl:   •  potassium chloride (MICRO-K) 10 MEQ CR capsule, TK 1 C PO AS NEEDED, Disp: , Rfl: 0  •  simvastatin (ZOCOR) 20 MG tablet, Take 20 mg by mouth Daily., Disp: , Rfl:     The following portions of the patient's history were reviewed and updated as appropriate: allergies, current medications, past family history, past medical history, past social history, past surgical history and problem list.    Review of Systems   Constitution: Negative.   Cardiovascular: Negative.    Respiratory: Negative.    Hematologic/Lymphatic: Negative for bleeding problem. Does not bruise/bleed easily.   Skin: Negative for rash.   Musculoskeletal: Negative for muscle  "weakness and myalgias.   Gastrointestinal: Negative for heartburn, nausea and vomiting.   Neurological: Negative.         Objective:     Vitals:    09/17/18 1431   BP: 150/94   BP Location: Left arm   Patient Position: Sitting   Pulse: 63   SpO2: 97%   Weight: 56 kg (123 lb 6.4 oz)   Height: 149.9 cm (59\")         Physical Exam   Constitutional: She is oriented to person, place, and time. She appears well-developed and well-nourished.   HENT:   Mouth/Throat: Oropharynx is clear and moist.   Neck: No JVD present. Carotid bruit is not present. No thyromegaly present.   Cardiovascular: Regular rhythm, S1 normal, S2 normal, normal heart sounds and intact distal pulses.  Exam reveals no gallop, no S3 and no S4.    No murmur heard.  Pulses:       Carotid pulses are 2+ on the right side, and 2+ on the left side.       Radial pulses are 2+ on the right side, and 2+ on the left side.   Pulmonary/Chest: Breath sounds normal.   Abdominal: Soft. Bowel sounds are normal. She exhibits no mass. There is no tenderness.   Musculoskeletal: She exhibits no edema.   Neurological: She is alert and oriented to person, place, and time.   Skin: Skin is warm and dry. No rash noted.     Lab Review:    Procedures  St. Lb dual-chamber pacemaker, model 2210:  Atrial pacing greater than 99%, atrial threshold 0.7, impedance 380 ohms.  RV pacing 2.1%, R wave 7.6, threshold 0.5, impedance 540 ohms.  Battery voltage is at 2.8 which is approximate 2-1/2 years.  She had less than 1% mode switching with the longest episode being one hour with a controlled rate.  Ventricular output was decreased to 1.75 V at 1 seconds.  Atrial output was increased to 1.5 at 1 ms.  She does have a home monitor.      Assessment:     Essential hypertension:  Fairly well controlled.  Would not make any adjustments today as she has a history of lightheadedness and dizziness with quick postural changes.  Fear the change in medications would cause symptomatic " orthostasis.    Mixed hyperlipidemia:  Continue Zocor and appropriate diet.  Get annual lipid panel with PCP.    SSS (sick sinus syndrome) (CMS/HCC):  Normal pacemaker interrogation.  Less than 1% mode switching.  At this time, we will decrease her amiodarone to 200 mg daily.  This was written down for the patient so she could understand instructions when she got home.  We do not feel that any rhythm issues on the cause of her dyspnea.  There was discussion about proceeding with echocardiogram but Dr. Man would like to hold off on this for now she is 89 years old, and the patient does not wish to have this done.  We also discussed clearance for surgery and at this time we felt that she should continue with medical management and her colostomy belt for her hernia.  As far as her dyspnea is concerned, she can increase her Lasix for the next 2 days to 60 mg then resume back to 40 mg daily on Thursday.  Patient understands she is at high surgical risk, although didn't tolerate her hip surgery 6 months ago.  We discussed that her risk, which are nonmodifiable.  Further discuss this with the surgeon before deciding whether or not to proceed.      Chio Palacios PA-C scribing for Dr. Amrit Man.  I, Amrit Man MD, personally performed the services described in this documentation as scribed by the above individual in my presence, and it is both accurate and complete

## 2018-12-10 NOTE — TELEPHONE ENCOUNTER
Marti Paniagua called stating Ms Yang is in hospice care and has not eaten in a week. She was asking if we could turn her pacemaker off. I explained to her that is not something that is done. Ms Yang will pass with the device on it would not prevent her death or prolong her life.